# Patient Record
Sex: FEMALE | Race: BLACK OR AFRICAN AMERICAN | URBAN - METROPOLITAN AREA
[De-identification: names, ages, dates, MRNs, and addresses within clinical notes are randomized per-mention and may not be internally consistent; named-entity substitution may affect disease eponyms.]

---

## 2023-01-31 ENCOUNTER — CLAIMS CREATED FROM THE CLAIM WINDOW (OUTPATIENT)
Dept: URBAN - METROPOLITAN AREA CLINIC 92 | Facility: CLINIC | Age: 23
End: 2023-01-31
Payer: COMMERCIAL

## 2023-01-31 ENCOUNTER — WEB ENCOUNTER (OUTPATIENT)
Dept: URBAN - METROPOLITAN AREA CLINIC 92 | Facility: CLINIC | Age: 23
End: 2023-01-31

## 2023-01-31 VITALS
HEIGHT: 63 IN | BODY MASS INDEX: 26.51 KG/M2 | WEIGHT: 149.6 LBS | TEMPERATURE: 97.1 F | DIASTOLIC BLOOD PRESSURE: 76 MMHG | HEART RATE: 88 BPM | SYSTOLIC BLOOD PRESSURE: 122 MMHG

## 2023-01-31 DIAGNOSIS — A04.8 H. PYLORI INFECTION: ICD-10-CM

## 2023-01-31 DIAGNOSIS — K26.5 PERFORATED DUODENAL ULCER: ICD-10-CM

## 2023-01-31 PROBLEM — 51868009: Status: ACTIVE | Noted: 2023-01-31

## 2023-01-31 PROCEDURE — 99203 OFFICE O/P NEW LOW 30 MIN: CPT

## 2023-01-31 NOTE — HPI-TODAY'S VISIT:
22-year-old female presents today for hospital visit follow-up.  She was sent to us by [facility] for a repeat surveillance endoscopy.  She was diagnosed with a perforated duodenal ulcer on 12- which was repaired laparoscopically with a Nikunj patch.  It is noted that she presented to the ER with 1 day of diffuse abdominal pain and vomiting, and she does have a history of cannabis hyperemesis for which she has been to the ER for many times.  Due to her level of pain a CT was obtained which demonstrated 1. Free air localized predominantly in the right upper quadrant and epigastrium with small-to-moderate volume free fluid in the perihepatic space and in the pelvis. Findings may represent a perforated peptic ulcer. There is no imaging evidence of gastric or duodenal wall thickening. The appendix, bowel and colon are grossly normal. 2.  Normal uterus and adnexa. 3.  Mild enhancement of the peritoneum along the perihepatic fluid collection likely represents reactive changes/developing abscess. No true encapsulated collection of or abscess in the abdomen or pelvis. Biopsy of stomach was also obtained which demonstrated chronic active H. pylori gastritis due to this she was started on triple therapy which she did complete.  She is currently on pantoprazole daily. Currently, states she is having no more abdominal pain and denies any nausea, vomiting, bloating.  She denies any current use of NSAIDs and only takes Tylenol as needed.  She does note she had a wine cooler recently and I let her know to DC alcohol.  She reports no smoking.  She denies any family history of GI related cancers.

## 2023-02-22 ENCOUNTER — TELEPHONE ENCOUNTER (OUTPATIENT)
Dept: URBAN - METROPOLITAN AREA CLINIC 92 | Facility: CLINIC | Age: 23
End: 2023-02-22

## 2023-02-22 ENCOUNTER — DASHBOARD ENCOUNTERS (OUTPATIENT)
Age: 23
End: 2023-02-22

## 2023-02-24 ENCOUNTER — OFFICE VISIT (OUTPATIENT)
Dept: URBAN - METROPOLITAN AREA CLINIC 92 | Facility: CLINIC | Age: 23
End: 2023-02-24

## 2023-02-24 PROBLEM — 721730009: Status: ACTIVE | Noted: 2023-02-24

## 2023-02-24 PROBLEM — 88968005: Status: ACTIVE | Noted: 2023-01-31

## 2023-03-17 ENCOUNTER — OFFICE VISIT (OUTPATIENT)
Dept: URBAN - METROPOLITAN AREA SURGERY CENTER 16 | Facility: SURGERY CENTER | Age: 23
End: 2023-03-17

## 2023-04-13 ENCOUNTER — TELEPHONE ENCOUNTER (OUTPATIENT)
Dept: URBAN - METROPOLITAN AREA CLINIC 92 | Facility: CLINIC | Age: 23
End: 2023-04-13

## 2023-04-17 ENCOUNTER — OFFICE VISIT (OUTPATIENT)
Dept: URBAN - METROPOLITAN AREA CLINIC 92 | Facility: CLINIC | Age: 23
End: 2023-04-17

## 2023-05-17 ENCOUNTER — OFFICE VISIT (OUTPATIENT)
Dept: URBAN - METROPOLITAN AREA CLINIC 92 | Facility: CLINIC | Age: 23
End: 2023-05-17

## 2023-05-17 NOTE — HPI-TODAY'S VISIT:
22-year-old female presents today for hospital visit follow-up.  She was sent to us by [facility] for a repeat surveillance endoscopy.  She was diagnosed with a perforated duodenal ulcer on 12- which was repaired laparoscopically with a Nikunj patch.  It is noted that she presented to the ER with 1 day of diffuse abdominal pain and vomiting, and she does have a history of cannabis hyperemesis for which she has been to the ER for many times.  Due to her level of pain a CT was obtained which demonstrated 1. Free air localized predominantly in the right upper quadrant and epigastrium with small-to-moderate volume free fluid in the perihepatic space and in the pelvis. Findings may represent a perforated peptic ulcer. There is no imaging evidence of gastric or duodenal wall thickening. The appendix, bowel and colon are grossly normal. 2.  Normal uterus and adnexa. 3.  Mild enhancement of the peritoneum along the perihepatic fluid collection likely represents reactive changes/developing abscess. No true encapsulated collection of or abscess in the abdomen or pelvis. Biopsy of stomach was also obtained which demonstrated chronic active H. pylori gastritis due to this she was started on triple therapy which she did complete.  She is currently on pantoprazole daily. Currently, states she is having no more abdominal pain and denies any nausea, vomiting, bloating.  She denies any current use of NSAIDs and only takes Tylenol as needed.  She does note she had a wine cooler recently and I let her know to DC alcohol.  She reports no smoking.  She denies any family history of GI related cancers.

## 2023-05-25 ENCOUNTER — OFFICE VISIT (OUTPATIENT)
Dept: URBAN - METROPOLITAN AREA SURGERY CENTER 16 | Facility: SURGERY CENTER | Age: 23
End: 2023-05-25